# Patient Record
Sex: FEMALE | Race: WHITE | Employment: OTHER | URBAN - METROPOLITAN AREA
[De-identification: names, ages, dates, MRNs, and addresses within clinical notes are randomized per-mention and may not be internally consistent; named-entity substitution may affect disease eponyms.]

---

## 2017-09-26 ENCOUNTER — TELEPHONE (OUTPATIENT)
Dept: CARDIOLOGY CLINIC | Age: 67
End: 2017-09-26

## 2017-09-26 DIAGNOSIS — Z13.220 SCREENING FOR HYPERLIPIDEMIA: ICD-10-CM

## 2017-09-26 DIAGNOSIS — I48.91 ATRIAL FIBRILLATION, UNSPECIFIED TYPE (HCC): Primary | ICD-10-CM

## 2017-09-26 DIAGNOSIS — E55.9 VITAMIN D DEFICIENCY: ICD-10-CM

## 2017-11-03 DIAGNOSIS — I48.91 ATRIAL FIBRILLATION, UNSPECIFIED TYPE (HCC): ICD-10-CM

## 2017-11-03 DIAGNOSIS — Z13.220 SCREENING FOR HYPERLIPIDEMIA: ICD-10-CM

## 2017-11-03 DIAGNOSIS — E55.9 VITAMIN D DEFICIENCY: ICD-10-CM

## 2017-11-06 ENCOUNTER — OFFICE VISIT (OUTPATIENT)
Dept: CARDIOLOGY CLINIC | Age: 67
End: 2017-11-06
Payer: MEDICARE

## 2017-11-06 VITALS
WEIGHT: 180 LBS | SYSTOLIC BLOOD PRESSURE: 130 MMHG | DIASTOLIC BLOOD PRESSURE: 60 MMHG | OXYGEN SATURATION: 100 % | HEART RATE: 78 BPM | BODY MASS INDEX: 25.83 KG/M2

## 2017-11-06 DIAGNOSIS — I48.91 ATRIAL FIBRILLATION, UNSPECIFIED TYPE (HCC): Primary | ICD-10-CM

## 2017-11-06 DIAGNOSIS — E55.9 VITAMIN D DEFICIENCY DISEASE: ICD-10-CM

## 2017-11-06 DIAGNOSIS — I10 ESSENTIAL HYPERTENSION: ICD-10-CM

## 2017-11-06 DIAGNOSIS — E78.5 HYPERLIPIDEMIA, UNSPECIFIED HYPERLIPIDEMIA TYPE: ICD-10-CM

## 2017-11-06 PROCEDURE — 99214 OFFICE O/P EST MOD 30 MIN: CPT | Performed by: INTERNAL MEDICINE

## 2017-11-06 RX ORDER — FLUOXETINE HYDROCHLORIDE 20 MG/1
CAPSULE ORAL
COMMUNITY
Start: 2017-10-18 | End: 2021-11-03

## 2017-11-13 NOTE — PROGRESS NOTES
edema. No syncope or near syncope. She is active with no limitations. She has had no hospitalizations or procedures. CARDIAC RISK FACTORS:  Hyperlipidemia:  Negative. Other Family Members:  Mildly positive. Hypertension:  Negative. Diabetes:  Negative. Smoking:  Negative. Peripheral Vascular Disease:  Negative. MEDICATIONS:  At this time, she is on Cardizem 30 mg p.r.n., Climara 0.0375 mg per hour twice a week, Prozac 20 mg daily, Cytomel 5 mcg 1-1/2 tablets daily, vitamin D 5000 units daily, and krill oil daily. PAST MEDICAL HISTORY:  [facility], tubal ligation in , tonsillectomy in , eye muscle surgery in , foot surgery with a neuroma in , cholesterol was in the 210 range, oatmeal diet and decreased to 170. FAMILY HISTORY:  Brother had an ablation in  for atrial fibrillation. He also possibly had a myocardial infarction. Father  of a stroke after a cancer surgery and mother  of parkinsonism. 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  is a  for [employer] and hopes to retire next year after he has been working for that school system for 20 years. She has 3 children, all sons, with 2 sons in [place] and 1 son in [place]. One son is an , one is a  and the other in [place] works with [employer]. She does not smoke or drink alcohol. REVIEW OF SYSTEMS:  Cardiac as above. Other 10 systems reviewed including neurologic, psychiatric, pulmonary, cardiac,GI, , renal, and musculoskeletal.  She has had no weight loss or weight gain. No change in bowel habits, no blood in stools. No fevers, sweats or chills. PHYSICAL EXAMINATION:  VITAL SIGNS:  Her blood pressure was 130/60 with a heart rate of 78 and regular. Respiratory rate 18. O2 saturation 100%. Weight 180 pounds.   GENERAL:  She is a in followup. DISCUSSION:  Mrs. Shola Villanueva has more episodes of atrial fibrillation. They are occurring every 2 to 3 months but can be several in a row during that time. Her CHADS score is 2 and she is having more episodes than I would anticipate. I think it will be more difficult as time goes on for her to determine when she is in atrial fibrillation. Her options are to start antiarrhythmic along with Xarelto or consider ablation. She has been tried on Calan before and did not tolerate this well. She is currently using Cardizem on an as-needed basis. I have a consult arranged for Dr. Anahi Jurado for evaluation for possible ablation. She has no symptoms to indicate that we need to do an ischemic workup. Again, I think her stress test was most consistent with breast artifact rather than a previous MI especially with her normal LV function with no wall motion abnormalities. I gave her a prescription for Xarelto 20 mg daily and I have called Dr. Anahi Jurado in [place] for an appointment. Thank you very much for allowing me the privilege of seeing Mrs. Morales. Any questions on my thoughts, please do not hesitate to contact me.     Sincerely,        Samir April    D: 11/06/2017 16:18:05       T: 11/07/2017 1:05:43     [id number]  Job#: [id number]     Doc#: [id number]

## 2018-06-21 DIAGNOSIS — I10 ESSENTIAL HYPERTENSION: ICD-10-CM

## 2018-06-21 DIAGNOSIS — E78.5 HYPERLIPIDEMIA, UNSPECIFIED HYPERLIPIDEMIA TYPE: ICD-10-CM

## 2018-06-21 DIAGNOSIS — E55.9 VITAMIN D DEFICIENCY DISEASE: ICD-10-CM

## 2018-06-21 DIAGNOSIS — I48.91 ATRIAL FIBRILLATION, UNSPECIFIED TYPE (HCC): ICD-10-CM

## 2018-06-22 ENCOUNTER — OFFICE VISIT (OUTPATIENT)
Dept: CARDIOLOGY CLINIC | Age: 68
End: 2018-06-22
Payer: MEDICARE

## 2018-06-22 VITALS
BODY MASS INDEX: 25.68 KG/M2 | WEIGHT: 179 LBS | OXYGEN SATURATION: 98 % | HEART RATE: 86 BPM | DIASTOLIC BLOOD PRESSURE: 70 MMHG | SYSTOLIC BLOOD PRESSURE: 122 MMHG

## 2018-06-22 DIAGNOSIS — I48.0 PAROXYSMAL ATRIAL FIBRILLATION (HCC): Primary | ICD-10-CM

## 2018-06-22 PROCEDURE — 99214 OFFICE O/P EST MOD 30 MIN: CPT | Performed by: INTERNAL MEDICINE

## 2019-12-20 ENCOUNTER — TELEPHONE (OUTPATIENT)
Dept: CARDIOLOGY CLINIC | Age: 69
End: 2019-12-20

## 2021-09-29 DIAGNOSIS — E78.5 HYPERLIPIDEMIA, UNSPECIFIED HYPERLIPIDEMIA TYPE: ICD-10-CM

## 2021-09-29 DIAGNOSIS — I48.20 ATRIAL FIBRILLATION, CHRONIC (HCC): Primary | ICD-10-CM

## 2021-09-29 DIAGNOSIS — E55.9 VITAMIN D DEFICIENCY DISEASE: ICD-10-CM

## 2021-09-29 DIAGNOSIS — I10 HYPERTENSION, UNSPECIFIED TYPE: ICD-10-CM

## 2021-10-07 LAB
ALBUMIN SERPL-MCNC: 4 G/DL
ALP BLD-CCNC: 80 U/L
ALT SERPL-CCNC: 20 U/L
ANION GAP SERPL CALCULATED.3IONS-SCNC: 11.3 MMOL/L
AST SERPL-CCNC: 21 U/L
BASOPHILS ABSOLUTE: NORMAL
BASOPHILS RELATIVE PERCENT: NORMAL
BILIRUB SERPL-MCNC: 0.5 MG/DL (ref 0.1–1.4)
BUN BLDV-MCNC: 26 MG/DL
CALCIUM SERPL-MCNC: 8.8 MG/DL
CHLORIDE BLD-SCNC: 105 MMOL/L
CO2: 27.7 MMOL/L
CREAT SERPL-MCNC: 0.81 MG/DL
EOSINOPHILS ABSOLUTE: NORMAL
EOSINOPHILS RELATIVE PERCENT: NORMAL
GFR CALCULATED: NORMAL
GLUCOSE BLD-MCNC: 94 MG/DL
HCT VFR BLD CALC: NORMAL %
HEMOGLOBIN: NORMAL
LYMPHOCYTES ABSOLUTE: NORMAL
LYMPHOCYTES RELATIVE PERCENT: NORMAL
MCH RBC QN AUTO: NORMAL PG
MCHC RBC AUTO-ENTMCNC: NORMAL G/DL
MCV RBC AUTO: NORMAL FL
MONOCYTES ABSOLUTE: NORMAL
MONOCYTES RELATIVE PERCENT: NORMAL
NEUTROPHILS ABSOLUTE: NORMAL
NEUTROPHILS RELATIVE PERCENT: NORMAL
PDW BLD-RTO: NORMAL %
PLATELET # BLD: NORMAL 10*3/UL
PMV BLD AUTO: NORMAL FL
POTASSIUM SERPL-SCNC: 4 MMOL/L
RBC # BLD: NORMAL 10*6/UL
SODIUM BLD-SCNC: 140 MMOL/L
TOTAL PROTEIN: 7.1
TSH SERPL DL<=0.05 MIU/L-ACNC: 1.98 UIU/ML
VITAMIN D 25-HYDROXY: 45
VITAMIN D2, 25 HYDROXY: NORMAL
VITAMIN D3,25 HYDROXY: NORMAL
WBC # BLD: NORMAL 10*3/UL

## 2021-10-25 DIAGNOSIS — E78.5 HYPERLIPIDEMIA, UNSPECIFIED HYPERLIPIDEMIA TYPE: ICD-10-CM

## 2021-10-25 DIAGNOSIS — E55.9 VITAMIN D DEFICIENCY DISEASE: ICD-10-CM

## 2021-10-25 DIAGNOSIS — I48.20 ATRIAL FIBRILLATION, CHRONIC (HCC): ICD-10-CM

## 2021-10-25 DIAGNOSIS — I10 HYPERTENSION, UNSPECIFIED TYPE: ICD-10-CM

## 2021-11-03 ENCOUNTER — HOSPITAL ENCOUNTER (OUTPATIENT)
Dept: NON INVASIVE DIAGNOSTICS | Age: 71
Discharge: HOME | End: 2021-11-03
Payer: MEDICARE

## 2021-11-03 ENCOUNTER — OFFICE VISIT (OUTPATIENT)
Dept: CARDIOLOGY CLINIC | Age: 71
End: 2021-11-03
Payer: MEDICARE

## 2021-11-03 VITALS
HEART RATE: 88 BPM | BODY MASS INDEX: 25.54 KG/M2 | DIASTOLIC BLOOD PRESSURE: 70 MMHG | WEIGHT: 178 LBS | OXYGEN SATURATION: 96 % | SYSTOLIC BLOOD PRESSURE: 120 MMHG

## 2021-11-03 DIAGNOSIS — I48.20 ATRIAL FIBRILLATION, CHRONIC (HCC): Primary | ICD-10-CM

## 2021-11-03 DIAGNOSIS — I48.20 ATRIAL FIBRILLATION, CHRONIC (HCC): ICD-10-CM

## 2021-11-03 PROCEDURE — 99214 OFFICE O/P EST MOD 30 MIN: CPT | Performed by: INTERNAL MEDICINE

## 2021-11-03 PROCEDURE — 93270 REMOTE 30 DAY ECG REV/REPORT: CPT

## 2021-11-03 NOTE — PROGRESS NOTES
Ov Dr. Cayetano García for follow up   Pt states taking Lexapro not prozac   Woke up 6 weeks ago felt terrible   \"wasn't reg a fib\" didn't feel right   Went to pcp-holter ordered   Few episodes of heart speeding up   One episode only   Energy level good  No syncope   No chest pain   Using elliptical weekly   Had eye surgery for \"droopy eye\"   Three sons still doing well  Spent 3 months in [place] this spring   Has an RV they just park in the  [address] [place] [address].   2 sons live in [place]  One in [place]  7 grand kids   Oldest is 15 today girl    bedside echo done       Will set up for event monitor     Recommend Kardia Device     Follow up in 6 weeks no testing

## 2021-11-08 NOTE — PROGRESS NOTES
Anant Cook M.D. [address] [place] [address] Y[address] Seg[place]ia   [phone]          November 3, 2021          Payton Baig MD  [address] [facility], [address] [place] [address]      RE:   Sophia Nye  :  1950      Dear Dr. Coy Hayes:    CHIEF COMPLAINT:  1. Paroxysmal atrial fibrillation. 2.  Status post ablation on 2018, by Dr. Bailee Yanez. 3.  Episode of tachycardia 6 weeks ago with a three-day event recorder placed with short episodes of SVT, which were asymptomatic, at a rate of 130 to 140 beats per minute. HISTORY OF PRESENT ILLNESS:  I had the pleasure of seeing Mrs. Sergio Mckinney in our office on 2021. She is a pleasant 80-year-old female who I met in 2018. She had been having episodes of paroxysmal atrial fibrillation for four to five years, occurring every three to four months. I saw her on 2016, and she was placed on Xarelto and Calan  mg daily. She had easy bruising. Echocardiogram on 2016, was normal.  Stress test was normal and I saw her on 2017. She was having more frequent episodes lasting for 18 hours. She would take Cardizem as needed. On 2017, we discussed ablation. Her CHADS score was 2, with her age being 78 and being female, and therefore, we started Xarelto 20 mg daily, which she remained. She had a subsequent ablation on 2018, by Dr. Bailee Yanez and had an excellent result. She was then taken off Xarelto and Cardizem. I last saw her on 2018, and at that time, she was doing well. I have not seen her since that time. She has had no chest pain or chest discomfort. She has had no palpitations. She has felt excellent. She would occasionally have mild episodes, where she could feel her heart \"beating harder\" but was totally asymptomatic. Six weeks ago, she woke up in the morning feeling \"terrible. \"  Heart was beating fast and \"not regular\" but did not feel like her \"atrial fibrillation. \"  This continued for approximately 45 to 50 minutes and subsided. She saw Dr. Tunde Young and a Holter monitor was ordered. During the three-day Holter monitor, she was totally asymptomatic. She had some short episodes of what appeared to be SVT at approximately 140 beats per minute. She had no prolonged episodes of SVT. She has had no chest pain, shortness of breath or loss of energy. She uses an elliptical weekly for approximately 35 minutes. She has had no hospitalizations or procedures except for eye surgery for \"droopy eye. \"    She has never had a myocardial infarction, never had a cardiac catheterization. I did a treadmill stress test in 2016, which was normal, and an echocardiogram on 2016, which was normal.    CARDIAC RISK FACTORS:  Hyperlipidemia:  Positive. Other Family Members:  Positive. Hypertension:  Negative. Diabetes:  Negative. Smoking:  Negative. Peripheral Vascular Disease:  Negative. MEDICATIONS AT THIS TIME:  She is on Climara 0.0375 mg per 24-hour patch, Cytomel 7.5 mcg b.i.d., vitamin D 1000 units daily. PAST MEDICAL AND SURGICAL HISTORY:  1.  Elbow surgery in  at [facility]. 2.  Hysterectomy in .  3.  Tubal ligation in .  4.  Tonsillectomy in .  5.  Eye muscle surgery in .  6.  Foot surgery with a neuroma in . 7. Hypercholesteremia. FAMILY HISTORY:  Brother had an ablation in  for atrial fibrillation. Father  of a stroke after cancer surgery. Mother  of parkinsonism. SOCIAL HISTORY:  She is 80years old, , retired. 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 She has three sons, with two in [place] and one in [place]. She and her  spent three months in [place] in the spring of 2021 using an RV that they parked at in the driveway.   Her son from [place], who works with [employer], was also in [place] for three months at that time. She has seven grandkids, with the oldest being 15 (\"a daughter, 15, going on 18\"). She does not smoke or drink alcohol. She exercises with a elliptical one day a week. REVIEW OF SYSTEMS:  Cardiac as above. Other systems reviewed including constitutional, eyes, ears, nose and throat, cardiovascular, respiratory, GI, , musculoskeletal, integumentary, neurologic, endocrine, hematologic and allergic/immunologic are negative except for what is described above. No weight loss or weight gain. No change in bowel habits. No blood in stools. No fevers, sweats or chills. PHYSICAL EXAMINATION:  VITAL SIGNS:  Her blood pressure was 120/70 with a heart rate of 88 and regular. Respiratory rate 18. O2 sat 96%. Weight 178 pounds. GENERAL:  She is a pleasant 79-year-old female. Denied pain. She was oriented to person, place and time. Answered questions appropriately. SKIN:  No unusual skin changes. HEENT:  The pupils are equally round and intact. Mucous membranes were dry. NECK:  No JVD. Good carotid pulses. No carotid bruits. No lymphadenopathy or thyromegaly. CARDIOVASCULAR EXAM:  S1 and S2 were normal.  No S3 or S4. Soft systolic blowing type murmur. No diastolic murmur. PMI was normal.  No lift, thrust, or pericardial friction rub. LUNGS:  Clear to auscultation and percussion. ABDOMEN:  Soft and nontender. Good bowel sounds. EXTREMITIES:  Good femoral pulses. Good pedal pulses. No pedal edema. Skin was warm and dry. No calf tenderness. Nail beds pink. Good cap refill. PULSES:  Bilateral symmetrical radial, brachial and carotid pulses. No carotid bruits. Good femoral and pedal pulses. NEUROLOGIC EXAM:  Within normal limits. PSYCHIATRIC EXAM:  Within normal limits. LABORATORY DATA:  Her white count was 5.0, hemoglobin 13.4 with a platelet count 441,466. Sodium was 140, potassium 4.0, glucose 94, BUN 26, creatinine 0.81, calcium was 8.8. ALT was 20, AST was 21. TSH 1.975. Vitamin D was 45. EKG showed normal sinus rhythm with sinus arrhythmia. Holter monitor showed short episodes of SVT of 4 to 6 beats at approximately 130 to 140 beats per minute. They were asymptomatic. There was no bradycardia. IMPRESSION:  1. Episode, 6 weeks ago, where her heart was \"beating fast and feeling terrible,\" lasting for approximately 45 minutes. 2.  Holter monitor showing short episodes of probably, what appears to be, SVT at 130 to 140 beats per minute although these were totally asymptomatic. 3.  Status post paroxysmal atrial fibrillation with ablation by Dr. Dicky Jeans in [facility] on 02/06/2018, with her off from Cardizem and Xarelto at this time. 4.  Normal LV function. 5.  History of mild hyperlipidemia. PLAN:  1. We will give her a 30-day event recorder to try to define her more significant tachycardia that she had 6 weeks ago. 2.  We will have her get a Kardia device to be able to monitor her rhythm also at home. 3.  We will meet with her in approximately 6 weeks to hopefully have a better idea of the arrhythmia that she had 6 weeks ago. DISCUSSION:  Mrs. Wendy Sharp had an episode of severe tachycardia that was quite symptomatic 6 weeks ago. A 3-day event recorder shows, what appears to be, an SVT at 130 to 140 beats per minute. However, she was asymptomatic with this and I am not sure that this is the arrhythmia she had 6 weeks ago. I am going to hold off on any treatment at this time and try to get a better idea of her arrhythmias. We will give her a 30-day event recorder. We will also have her get a Kardia device that I have used with cardiac patients to monitor their rhythm at home. If she would feel the same feeling that she had 6 weeks ago, she could take her own rhythm strips along with now her 30-day event recorder. I will meet with her again in 6 weeks and hopefully she will have some arrhythmias to treat at that time.     I will not anticoagulate as it does not appear to be atrial fibrillation at this time. It could be atrial flutter with 2:1 conduction, but I do not have enough evidence to anticoagulate. Thank you very much for allowing me the privilege of seeing Mrs. Morales. If you have any questions on my thoughts, please do not hesitate to contact me.      Sincerely,        Trudy Benjamin    D: 11/03/2021 12:41:46     T: 11/03/2021 12:46:16     [email or web address]  Job#: [id number]   Doc#: [id number]

## 2021-12-07 NOTE — PROCEDURES
[place] [address]                                 EVENT MONITOR    PATIENT NAME: Danielle Chu                       :        1950  MED REC NO:   [id number]                              ROOM:  ACCOUNT NO:   [de-identified]                           ADMIT DATE: 2021  PROVIDER:     Brittany Oviedo    TEST TYPE:  A 30-DAY EVENT RECORDER. INDICATION FOR STUDY:  PALPITATIONS AND ATRIAL FIBRILLATION. The patient wore an event recorder from 2021 to 2021. Her  lowest heart rate was 50 with a highest heart rate of 188 and average  heart rate of 78 beats per minute. We received a total of 45 rhythm strips with 4 being patient triggered  and 41 being autotriggered. Her underlying rhythm was a normal sinus rhythm. She had multiple episodes of atrial fibrillation with RVR with heart  rates in the 150 to 170 range. She had one less than 1% atrial  fibrillation during the entire recording. She had no bradycardia. This is overall an abnormal 30-day event recorder with multiple episodes  of atrial fibrillation with RVR. She needs to be anticoagulated with Eliquis 5 mg b.i.d. Since she is  having multiple episodes of atrial fibrillation with RVR, I would place  her on Cardizem  mg daily and monitor. She will be monitoring  with her Kardia device at home also.         Emily Coleman    D: 2021 13:06:32       T: 2021 20:13:45     [email or web address]  Job#: [id number]     Doc#: [id number]    CC:  Mayda Wright

## 2021-12-15 ENCOUNTER — OFFICE VISIT (OUTPATIENT)
Dept: CARDIOLOGY CLINIC | Age: 71
End: 2021-12-15
Payer: MEDICARE

## 2021-12-15 VITALS
SYSTOLIC BLOOD PRESSURE: 110 MMHG | WEIGHT: 178 LBS | DIASTOLIC BLOOD PRESSURE: 60 MMHG | BODY MASS INDEX: 25.54 KG/M2 | OXYGEN SATURATION: 97 % | HEART RATE: 74 BPM

## 2021-12-15 DIAGNOSIS — I48.20 ATRIAL FIBRILLATION, CHRONIC (HCC): Primary | ICD-10-CM

## 2021-12-15 DIAGNOSIS — E55.9 VITAMIN D DEFICIENCY DISEASE: ICD-10-CM

## 2021-12-15 DIAGNOSIS — E78.5 HYPERLIPIDEMIA, UNSPECIFIED HYPERLIPIDEMIA TYPE: ICD-10-CM

## 2021-12-15 DIAGNOSIS — I10 HYPERTENSION, UNSPECIFIED TYPE: ICD-10-CM

## 2021-12-15 PROCEDURE — 99214 OFFICE O/P EST MOD 30 MIN: CPT | Performed by: INTERNAL MEDICINE

## 2021-12-21 NOTE — PROGRESS NOTES
Ernestine Mari MD  [facility]  [address]  [phone]      December 15, 2021      Elena Landin MD  [address], [address] [place] [address]      RE:   Sharri Long  :  1950      Dear Dr. Moore Necessary:    CHIEF COMPLAINT:  Recurrent episodes of atrial fibrillation with heart rates between 150 and 170. HISTORY OF PRESENT ILLNESS:  I had the pleasure of meeting with Mrs. Gilda Haynes and her  in the office on 12/15/2021. I trust you received my full H and P from 2021. At that time, she had had episodes of tachycardia and I gave her a 30-day event recorder. She was brought back today to review the event recorder. She states that she has had two events that her heart was \"beating fast.\"  However, when I reviewed the event recorder, she had had multiple short episodes of atrial fibrillation with some mild RVR and some more rapid ventricular responses. I showed her and her  the event recorder and they could see her episodes of atrial fibrillation. She denies any chest pain or chest discomfort or any unusual shortness of breath. No dizziness or lightheadedness. I recommended that we start Eliquis 5 mg b.i.d.  I also recommended starting low dose diltiazem at 30 mg b.i.d. She did get a Kardia device and if she feels any palpitations, she will send a recording. I suspect that we will need to increase her diltiazem, but we will attempt to start it very low and increase as needed. She will call us next month with an update and I will plan on seeing her in two months with an EKG. I did go over with her and her  that even if she has another ablation for her atrial fibrillation that she would still stay on Eliquis indefinitely. Therefore, the reason for ablation would be for symptom control rather than to get off her Eliquis.   At this point, knowing that she does not wish to have another ablation, we will try symptom control. Thank you very much for allowing me the privilege of seeing Mrs. Morales. If you have any questions on my thoughts, please do not hesitate to contact me.     Sincerely,        Leslie Miranda    D: 12/20/2021 2:27:37     T: 12/20/2021 5:46:46     [email or web address]  Job#: [id number]   Doc#: [id number]

## 2022-02-07 ENCOUNTER — OFFICE VISIT (OUTPATIENT)
Dept: CARDIOLOGY CLINIC | Age: 72
End: 2022-02-07
Payer: MEDICARE

## 2022-02-07 ENCOUNTER — HOSPITAL ENCOUNTER (OUTPATIENT)
Age: 72
Discharge: HOME | End: 2022-02-07
Payer: MEDICARE

## 2022-02-07 VITALS
SYSTOLIC BLOOD PRESSURE: 120 MMHG | OXYGEN SATURATION: 96 % | HEART RATE: 77 BPM | BODY MASS INDEX: 26.11 KG/M2 | DIASTOLIC BLOOD PRESSURE: 80 MMHG | WEIGHT: 182 LBS

## 2022-02-07 DIAGNOSIS — E78.5 HYPERLIPIDEMIA, UNSPECIFIED HYPERLIPIDEMIA TYPE: ICD-10-CM

## 2022-02-07 DIAGNOSIS — E55.9 VITAMIN D DEFICIENCY DISEASE: ICD-10-CM

## 2022-02-07 DIAGNOSIS — I10 HYPERTENSION, UNSPECIFIED TYPE: ICD-10-CM

## 2022-02-07 DIAGNOSIS — I48.20 ATRIAL FIBRILLATION, CHRONIC (HCC): Primary | ICD-10-CM

## 2022-02-07 DIAGNOSIS — I48.20 ATRIAL FIBRILLATION, CHRONIC (HCC): ICD-10-CM

## 2022-02-07 PROCEDURE — 93005 ELECTROCARDIOGRAM TRACING: CPT

## 2022-02-07 PROCEDURE — 99214 OFFICE O/P EST MOD 30 MIN: CPT | Performed by: INTERNAL MEDICINE

## 2022-02-07 NOTE — PROGRESS NOTES
Ov Dr. Dustin Richardson for follow up   occ can feel the a fib   When laying down  No chest pain or heaviness   No issues doing things    No changes    Follow up in one year

## 2022-02-08 LAB
EKG ATRIAL RATE: 67 BPM
EKG P AXIS: 69 DEGREES
EKG P-R INTERVAL: 164 MS
EKG Q-T INTERVAL: 382 MS
EKG QRS DURATION: 78 MS
EKG QTC CALCULATION (BAZETT): 403 MS
EKG R AXIS: 73 DEGREES
EKG T AXIS: 67 DEGREES
EKG VENTRICULAR RATE: 67 BPM

## 2022-02-08 PROCEDURE — 93010 ELECTROCARDIOGRAM REPORT: CPT | Performed by: INTERNAL MEDICINE

## 2022-02-09 NOTE — PROGRESS NOTES
Darek Ramirez M.D. [address] [place] [address], [place] [address]  [phone]          2022          Emanuel Andrew MD  [address], [address] [place] [address]      RE:   Leopold Rich  :  1950      Dear Dr. Espitia:    CHIEF COMPLAINT:  1. Atrial fibrillation, relatively well controlled. 2.  On Eliquis. HISTORY OF PRESENT ILLNESS:  I had the pleasure of seeing Leopold Rich again in our office on 2022. I trust you have my full H and P from . We had given her a 30-day event recorder that showed multiple episodes of atrial fibrillation with mild RVR and others went to more rapid ventricular response. This was consistent with atrial fibrillation and I placed her on Eliquis 5 mg b.i.d. We also started diltiazem 30 mg b.i.d., and we brought her back today for reevaluation. She has done excellent. She really cannot feel any atrial fibrillation except occasionally when she lies down at night, she can feel her heart beating slightly fast.  By the time she gets her Kardia device activated, her heart rate is normal.    She has no chest pain or chest discomfort. No shortness of breath. No PND, orthopnea or pedal edema. She really has no complaints as I see her today. She is unlimited in her activity. MEDICATIONS:  Her medications are as follows, Eliquis 5 mg b.i.d., Cardizem 30 mg b.i.d., Climara 0.0375 mg twice a week, Cytomel 5 mcg one and a half tablets b.i.d., vitamin D 1000 units daily. PHYSICAL EXAMINATION:  VITAL SIGNS:  Her blood pressure was excellent at 120/70, heart rate of 70 and regular. Respiratory rate 18. Her exam was unremarkable. IMPRESSION:  1. Paroxysmal atrial fibrillation, well controlled. 2.  On Eliquis 5 mg b.i.d. PLAN:  1. Keep on same medications. 2.  See in one year unless she starts developing new issues. DISCUSSION:  Kandi Felipe, is doing well.   Her atrial fibrillation is well controlled and she is asymptomatic. Therefore, there is no indication to place her on a stronger antiarrhythmic such as flecainide or to do ablation as she would still need to be anticoagulated. If she had difficulty with anticoagulation, she can always get a Watchman device, but at this point, she is doing well with Eliquis. I will plan on seeing her in one year unless she would start developing new symptoms. She and her  are a delight to see and I appreciate being involved in their care very much. Thank you very much.     Sincerely,        Gabby Roberts    D: 02/07/2022 12:36:10     T: 02/07/2022 12:38:55     [email or web address]  Job#: [id number]   Doc#: [id number]

## 2023-01-06 RX ORDER — APIXABAN 5 MG/1
TABLET, FILM COATED ORAL
Qty: 180 TABLET | Refills: 3 | Status: SHIPPED | OUTPATIENT
Start: 2023-01-06

## 2023-02-02 ENCOUNTER — HOSPITAL ENCOUNTER (OUTPATIENT)
Age: 73
Discharge: HOME | End: 2023-02-04
Payer: MEDICARE

## 2023-02-02 ENCOUNTER — HOSPITAL ENCOUNTER (OUTPATIENT)
Dept: GENERAL RADIOLOGY | Age: 73
Discharge: HOME | End: 2023-02-04
Payer: MEDICARE

## 2023-02-02 ENCOUNTER — HOSPITAL ENCOUNTER (OUTPATIENT)
Age: 73
Discharge: HOME | End: 2023-02-02
Payer: MEDICARE

## 2023-02-02 DIAGNOSIS — E78.5 HYPERLIPIDEMIA, UNSPECIFIED HYPERLIPIDEMIA TYPE: ICD-10-CM

## 2023-02-02 DIAGNOSIS — I10 HYPERTENSION, UNSPECIFIED TYPE: ICD-10-CM

## 2023-02-02 DIAGNOSIS — E55.9 VITAMIN D DEFICIENCY DISEASE: ICD-10-CM

## 2023-02-02 DIAGNOSIS — I48.20 ATRIAL FIBRILLATION, CHRONIC (HCC): ICD-10-CM

## 2023-02-02 LAB
25(OH)D3 SERPL-MCNC: 45.8 NG/ML
ABSOLUTE EOS #: 0.1 K/UL (ref 0–0.4)
ABSOLUTE LYMPH #: 1.7 K/UL (ref 1–4.8)
ABSOLUTE MONO #: 0.4 K/UL (ref 0–1)
ALBUMIN SERPL-MCNC: 4.4 G/DL (ref 3.5–5.2)
ALP SERPL-CCNC: 74 U/L (ref 35–104)
ALT SERPL-CCNC: 24 U/L (ref 5–33)
ANION GAP SERPL CALCULATED.3IONS-SCNC: 11 MMOL/L (ref 9–17)
AST SERPL-CCNC: 24 U/L
BASOPHILS # BLD: 1 % (ref 0–2)
BASOPHILS ABSOLUTE: 0 K/UL (ref 0–0.2)
BILIRUB SERPL-MCNC: 0.8 MG/DL (ref 0.3–1.2)
BUN SERPL-MCNC: 23 MG/DL (ref 8–23)
BUN/CREAT BLD: 27 (ref 9–20)
CALCIUM SERPL-MCNC: 9.4 MG/DL (ref 8.6–10.4)
CHLORIDE SERPL-SCNC: 101 MMOL/L (ref 98–107)
CHOLEST SERPL-MCNC: 207 MG/DL
CHOLESTEROL/HDL RATIO: 3.6
CO2 SERPL-SCNC: 26 MMOL/L (ref 20–31)
CREAT SERPL-MCNC: 0.84 MG/DL (ref 0.5–0.9)
DIFFERENTIAL TYPE: YES
EOSINOPHILS RELATIVE PERCENT: 3 % (ref 0–5)
GFR SERPL CREATININE-BSD FRML MDRD: >60 ML/MIN/1.73M2
GLUCOSE SERPL-MCNC: 94 MG/DL (ref 70–99)
HCT VFR BLD AUTO: 39.9 % (ref 36–46)
HDLC SERPL-MCNC: 57 MG/DL
HGB BLD-MCNC: 13.4 G/DL (ref 12–16)
LDLC SERPL CALC-MCNC: 136 MG/DL (ref 0–130)
LYMPHOCYTES # BLD: 40 % (ref 15–40)
MAGNESIUM SERPL-MCNC: 2.2 MG/DL (ref 1.6–2.6)
MCH RBC QN AUTO: 28.2 PG (ref 26–34)
MCHC RBC AUTO-ENTMCNC: 33.6 G/DL (ref 31–37)
MCV RBC AUTO: 83.8 FL (ref 80–100)
MONOCYTES # BLD: 8 % (ref 4–8)
PATIENT FASTING?: YES
PDW BLD-RTO: 12.5 % (ref 12.1–15.2)
PLATELET # BLD AUTO: 260 K/UL (ref 140–450)
POTASSIUM SERPL-SCNC: 4 MMOL/L (ref 3.7–5.3)
PROT SERPL-MCNC: 7 G/DL (ref 6.4–8.3)
RBC # BLD: 4.76 M/UL (ref 4–5.2)
SEG NEUTROPHILS: 48 % (ref 47–75)
SEGMENTED NEUTROPHILS ABSOLUTE COUNT: 2.1 K/UL (ref 2.5–7)
SODIUM SERPL-SCNC: 138 MMOL/L (ref 135–144)
TRIGL SERPL-MCNC: 68 MG/DL
TSH SERPL-ACNC: 1.86 UIU/ML (ref 0.3–5)
WBC # BLD AUTO: 4.4 K/UL (ref 3.5–11)

## 2023-02-02 PROCEDURE — 84443 ASSAY THYROID STIM HORMONE: CPT

## 2023-02-02 PROCEDURE — 85025 COMPLETE CBC W/AUTO DIFF WBC: CPT

## 2023-02-02 PROCEDURE — 36415 COLL VENOUS BLD VENIPUNCTURE: CPT

## 2023-02-02 PROCEDURE — 93005 ELECTROCARDIOGRAM TRACING: CPT

## 2023-02-02 PROCEDURE — 82306 VITAMIN D 25 HYDROXY: CPT

## 2023-02-02 PROCEDURE — 83735 ASSAY OF MAGNESIUM: CPT

## 2023-02-02 PROCEDURE — 80061 LIPID PANEL: CPT

## 2023-02-02 PROCEDURE — 80053 COMPREHEN METABOLIC PANEL: CPT

## 2023-02-02 PROCEDURE — 71046 X-RAY EXAM CHEST 2 VIEWS: CPT

## 2023-02-03 LAB
EKG ATRIAL RATE: 72 BPM
EKG P AXIS: 76 DEGREES
EKG P-R INTERVAL: 158 MS
EKG Q-T INTERVAL: 370 MS
EKG QRS DURATION: 72 MS
EKG QTC CALCULATION (BAZETT): 405 MS
EKG R AXIS: 74 DEGREES
EKG T AXIS: 70 DEGREES
EKG VENTRICULAR RATE: 72 BPM

## 2023-02-06 ENCOUNTER — OFFICE VISIT (OUTPATIENT)
Dept: CARDIOLOGY CLINIC | Age: 73
End: 2023-02-06
Payer: MEDICARE

## 2023-02-06 VITALS
BODY MASS INDEX: 26.26 KG/M2 | SYSTOLIC BLOOD PRESSURE: 110 MMHG | DIASTOLIC BLOOD PRESSURE: 60 MMHG | HEART RATE: 86 BPM | OXYGEN SATURATION: 98 % | WEIGHT: 183 LBS

## 2023-02-06 DIAGNOSIS — I48.20 ATRIAL FIBRILLATION, CHRONIC (HCC): Primary | ICD-10-CM

## 2023-02-06 DIAGNOSIS — E78.5 HYPERLIPIDEMIA, UNSPECIFIED HYPERLIPIDEMIA TYPE: ICD-10-CM

## 2023-02-06 DIAGNOSIS — I10 HYPERTENSION, UNSPECIFIED TYPE: ICD-10-CM

## 2023-02-06 PROCEDURE — 1123F ACP DISCUSS/DSCN MKR DOCD: CPT | Performed by: INTERNAL MEDICINE

## 2023-02-06 PROCEDURE — 99214 OFFICE O/P EST MOD 30 MIN: CPT | Performed by: INTERNAL MEDICINE

## 2023-02-06 PROCEDURE — 3074F SYST BP LT 130 MM HG: CPT | Performed by: INTERNAL MEDICINE

## 2023-02-06 PROCEDURE — 3078F DIAST BP <80 MM HG: CPT | Performed by: INTERNAL MEDICINE

## 2023-02-06 NOTE — PROGRESS NOTES
Ov DR Margaree Litten 1 year follow up   No chest pain or sob  No palpitations. Had rt rotator cuff surgery  In the fall at [facility]. Did have colonoscopy done  Last week. Has 15 yr old grand daughter  Sabine Harris  In [place] is a trip\"  ADHD  Spent October in [place]  For a month after surgery. Will see in 1 year.

## 2023-02-08 NOTE — PROGRESS NOTES
Meredith Syed M.D. [address] [facility]   [phone]          2023          Demi Juarez MD  [address], [address] [place] [address]      RE:   Adali Jin  :  1950      Dear Dr. Godwin Briggs:    CHIEF COMPLAINT:  1. Paroxysmal atrial fibrillation. 2.  On Eliquis and Cardizem. HISTORY OF PRESENT ILLNESS:  I had the pleasure of seeing Mrs. Adali Jin in our office on 2023. (By the way, I was sorry to hear that you need surgical treatment of your neck. .. I hope everything goes well!)    Porfirio Jacob is a very pleasant 64-year-old female who has paroxysmal atrial fibrillation for 45 years occurring every three to four months. I saw her on 2016, when she was placed on Xarelto and Calan  mg daily. Echocardiogram on 2016, was normal.  Stress test was normal.  She was having more frequent episodes lasting up to 18 hours. On 2017, we discussed ablation. Her CHADS score was 2 and she continued her Xarelto 20 mg daily. She did have ablation on 2018, by Dr. Ranulfo Urbina with an excellent result. She was taken off Xarelto and Cardizem and I had seen her on 2018. I then saw her on 2021. She had woken up 6 weeks prior feeling \"terrible. \"  Her heart was beating fast.  This continued for approximately 45 to 50 minutes and subsided and Dr. Godwin Briggs ordered a Holter monitor. She was asymptomatic during the three-day Holter monitor. I then did a 30-day event recorder. The event recorder was abnormal showing multiple episodes of atrial fibrillation with mild RVR, also into more of a rapid ventricular response consistent with atrial fibrillation. We placed her back on Eliquis 5 mg b.i.d. and we also started Cardizem 30 mg b.i.d.  I last saw her on 2022. At that time, she was doing well with no symptoms.   She was also having no problems with any bleeding issues. She has done well over the past year. She did have a rotator cuff repair at [facility] on 2022 of her right shoulder by Dr. Sidney Callahan. She went to [place] to stay with her sons for one month in 2022. She is now back at home and is in physical therapy with making a good recovery from her right rotator cuff surgery. She did have colonoscopy last week, which was unremarkable. She is doing well. She has had no palpitations. No known episodes of atrial fibrillation. Denies any chest pain or chest discomfort or any unusual shortness of breath. She has had no PND or orthopnea. No syncope or near syncope. CARDIAC RISK FACTORS:  Hyperlipidemia:  Positive. Other Family Members:  Positive. Hypertension:  Negative. Diabetes:  Negative. Smoking:  Negative. Peripheral Vascular Disease:  Negative. MEDICATIONS AT HOME:  She is currently on Eliquis 5 mg b.i.d., Cardizem 30 mg b.i.d., Climara 0.0375 mg one patch b.i.d., Cytomel 7.5 mcg b.i.d., krill oil 500 mg b.i.d., vitamin D 1000 units daily. PAST MEDICAL AND SURGICAL HISTORY:  1.  Elbow surgery in  at [facility]. 2.  Hysterectomy in .  3.  Tubal ligation in .  4.  Tonsillectomy in .  5.  Eye muscle surgery in .  6.  Foot surgery with a neuroma in .  7. She has had hypercholesteremia, not on treatment. 8.  Right rotator cuff surgery at [facility] by Dr. Sidney Callahan on 2022.  9.  Colonoscopy one week ago. FAMILY HISTORY:  Brother had ablation in  for atrial fibrillation. Father  of a stroke after cancer surgery. Mother  of parkinsonism. SOCIAL HISTORY:  She is 71years old. , tired. 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 She has three sons, with two in [place], one in [place].   Her son in [place] has three children with a 15year-old daughter by the name, Krzysztof Santana, is on the spectrum and \"is a trip. \"  She has a brother who is 6, who is also [employer] trip. \"  There is a 5year-old youngest son who is \"normal.\"  Does not smoke or drink alcohol. She is in rehab now for her right shoulder surgery. REVIEW OF SYSTEMS:  Cardiac as above. Other systems reviewed including constitutional, eyes, ears, nose and throat, cardiovascular, respiratory, GI, , musculoskeletal, integumentary, neurologic, endocrine, hematologic and allergic/immunologic are negative except for what is described above. No weight loss or weight gain. No change in bowel habits. No blood in stools. No fevers, sweats or chills. PHYSICAL EXAMINATION:  VITAL SIGNS:  Her blood pressure was 110/60 with a heart rate of 86 and regular. Respirations 18. O2 sat 98%. Weight 183 pounds. GENERAL:  She is a pleasant 52-year-old female. Denied pain. She was oriented to person, place and time. Answered questions appropriately. SKIN:  No unusual skin changes. HEENT:  The pupils are equally round and intact. Mucous membranes were dry. NECK:  No JVD. Good carotid pulses. No carotid bruits. No lymphadenopathy or thyromegaly. CARDIOVASCULAR EXAM:  S1 and S2 were normal.  No S3 or S4. Soft systolic blowing type murmur. No diastolic murmur. PMI was normal.  No lift, thrust, or pericardial friction rub. LUNGS:  Clear to auscultation and percussion. ABDOMEN:  Soft and nontender. Good bowel sounds. The aorta was not enlarged. No hepatomegaly, splenomegaly. EXTREMITIES:  Good femoral pulses. Good pedal pulses. No pedal edema. Skin was warm and dry. No calf tenderness. Nail beds pink. Good cap refill. PULSES:  Bilateral symmetrical radial, brachial and carotid pulses. No carotid bruits. Good femoral and pedal pulses. NEUROLOGIC EXAM:  Within normal limits. PSYCHIATRIC EXAM:  Within normal limits.     LABORATORY DATA:  Sodium was 138, potassium 4.0, BUN 23, creatinine 1.84, GFR greater than 60, magnesium 2.2, glucose 94, calcium was 9.4. Cholesterol 207, with an HDL of 57, , triglycerides 68. ALT was 24, AST was 24. TSH was 1.86. Vitamin D 45.8. White count 4.4, hemoglobin 13.4 with a platelet count of 228,730. EKG showed normal sinus rhythm and was normal.    Chest x-ray was unremarkable. IMPRESSION:  1. Paroxysmal atrial fibrillation, currently on Eliquis and Cardizem 30 mg b.i.d.  2.  Paroxysmal atrial fibrillation with ablation by Dr. Coretta Lopez in [place] on 02/06/2018. 3.  Recurrent episodes of paroxysmal atrial fibrillation on a 30-day event recorder done on 11/03/2021, with her being placed on Eliquis and Cardizem at that time with no further episodes. 4.  Status post right rotator cuff surgery in [facility] by Dr. Ramona Espinal on 09/29/2022.  5.  Normal colonoscopy done one week ago. 6.  Mild hyperlipidemia, not treated. PLAN:  1. No change in medications. 2.  See in one year. DISCUSSION:  Nancie Estrada, is doing well. She has had no chest pain, shortness of breath or loss of energy. There is no indication that she has any underlying angina or heart issues. She may well be having asymptomatic paroxysmal atrial fibrillation, but she has no symptoms and she is protected with Eliquis. Therefore, I made no change in her medications. I will plan on seeing her in one year. Thank you very much for allowing me the privilege of seeing Margarito Martinez. If you have any questions on my thoughts, please do not hesitate to contact me.      Sincerely,        Lesa Payne MD    D: 02/06/2023 12:07:17     T: 02/06/2023 12:13:06     [email or web address]  Job#: [id number]   Doc#: [id number]

## 2024-01-10 DIAGNOSIS — E55.9 VITAMIN D DEFICIENCY DISEASE: ICD-10-CM

## 2024-01-10 DIAGNOSIS — E78.5 HYPERLIPIDEMIA, UNSPECIFIED HYPERLIPIDEMIA TYPE: ICD-10-CM

## 2024-01-10 DIAGNOSIS — I48.20 ATRIAL FIBRILLATION, CHRONIC (HCC): Primary | ICD-10-CM

## 2024-01-10 DIAGNOSIS — I10 HYPERTENSION, UNSPECIFIED TYPE: ICD-10-CM

## 2024-03-21 DIAGNOSIS — E78.5 HYPERLIPIDEMIA, UNSPECIFIED HYPERLIPIDEMIA TYPE: ICD-10-CM

## 2024-03-21 DIAGNOSIS — I10 HYPERTENSION, UNSPECIFIED TYPE: ICD-10-CM

## 2024-03-21 DIAGNOSIS — I48.20 ATRIAL FIBRILLATION, CHRONIC (HCC): Primary | ICD-10-CM

## 2024-03-21 DIAGNOSIS — E55.9 VITAMIN D DEFICIENCY: ICD-10-CM

## 2024-04-11 DIAGNOSIS — E78.5 HYPERLIPIDEMIA, UNSPECIFIED HYPERLIPIDEMIA TYPE: ICD-10-CM

## 2024-04-11 DIAGNOSIS — E55.9 VITAMIN D DEFICIENCY DISEASE: ICD-10-CM

## 2024-04-11 DIAGNOSIS — I10 HYPERTENSION, UNSPECIFIED TYPE: ICD-10-CM

## 2024-04-11 DIAGNOSIS — I48.20 ATRIAL FIBRILLATION, CHRONIC (HCC): Primary | ICD-10-CM

## 2024-08-20 ENCOUNTER — HOSPITAL ENCOUNTER (OUTPATIENT)
Age: 74
Discharge: HOME | End: 2024-08-20
Payer: MEDICARE

## 2024-08-20 ENCOUNTER — OFFICE VISIT (OUTPATIENT)
Dept: CARDIOLOGY CLINIC | Age: 74
End: 2024-08-20

## 2024-08-20 VITALS
RESPIRATION RATE: 16 BRPM | WEIGHT: 181 LBS | DIASTOLIC BLOOD PRESSURE: 62 MMHG | OXYGEN SATURATION: 96 % | SYSTOLIC BLOOD PRESSURE: 112 MMHG | BODY MASS INDEX: 25.97 KG/M2 | HEART RATE: 70 BPM

## 2024-08-20 DIAGNOSIS — E55.9 VITAMIN D DEFICIENCY DISEASE: ICD-10-CM

## 2024-08-20 DIAGNOSIS — I48.20 ATRIAL FIBRILLATION, CHRONIC (HCC): ICD-10-CM

## 2024-08-20 DIAGNOSIS — I10 HYPERTENSION, UNSPECIFIED TYPE: ICD-10-CM

## 2024-08-20 DIAGNOSIS — E78.5 HYPERLIPIDEMIA, UNSPECIFIED HYPERLIPIDEMIA TYPE: ICD-10-CM

## 2024-08-20 DIAGNOSIS — I48.20 ATRIAL FIBRILLATION, CHRONIC (HCC): Primary | ICD-10-CM

## 2024-08-20 LAB
25(OH)D3 SERPL-MCNC: 47 NG/ML (ref 30–100)
ALBUMIN SERPL-MCNC: 4.2 G/DL (ref 3.5–5.2)
ALP SERPL-CCNC: 76 U/L (ref 35–104)
ALT SERPL-CCNC: 16 U/L (ref 5–33)
ANION GAP SERPL CALCULATED.3IONS-SCNC: 9 MMOL/L (ref 9–17)
AST SERPL-CCNC: 22 U/L
BASOPHILS # BLD: 0.02 K/UL (ref 0–0.2)
BASOPHILS NFR BLD: 0 % (ref 0–2)
BILIRUB SERPL-MCNC: 0.7 MG/DL (ref 0.3–1.2)
BUN SERPL-MCNC: 18 MG/DL (ref 8–23)
BUN/CREAT SERPL: 23 (ref 9–20)
CALCIUM SERPL-MCNC: 9.5 MG/DL (ref 8.6–10.4)
CHLORIDE SERPL-SCNC: 101 MMOL/L (ref 98–107)
CHOLEST SERPL-MCNC: 194 MG/DL (ref 0–199)
CHOLESTEROL/HDL RATIO: 4
CO2 SERPL-SCNC: 26 MMOL/L (ref 20–31)
CREAT SERPL-MCNC: 0.8 MG/DL (ref 0.5–0.9)
EOSINOPHIL # BLD: 0.14 K/UL (ref 0–0.4)
EOSINOPHILS RELATIVE PERCENT: 3 % (ref 0–5)
ERYTHROCYTE [DISTWIDTH] IN BLOOD BY AUTOMATED COUNT: 12.6 % (ref 12.1–15.2)
GFR, ESTIMATED: 78 ML/MIN/1.73M2
GLUCOSE SERPL-MCNC: 95 MG/DL (ref 70–99)
HCT VFR BLD AUTO: 39.3 % (ref 36–46)
HDLC SERPL-MCNC: 52 MG/DL
HGB BLD-MCNC: 13.1 G/DL (ref 12–16)
IMM GRANULOCYTES # BLD AUTO: 0.01 K/UL (ref 0–0.3)
IMM GRANULOCYTES NFR BLD: 0 % (ref 0–5)
LDLC SERPL CALC-MCNC: 128 MG/DL (ref 0–100)
LYMPHOCYTES NFR BLD: 1.67 K/UL (ref 1–4.8)
LYMPHOCYTES RELATIVE PERCENT: 36 % (ref 15–40)
MAGNESIUM SERPL-MCNC: 2.2 MG/DL (ref 1.6–2.6)
MCH RBC QN AUTO: 29 PG (ref 26–34)
MCHC RBC AUTO-ENTMCNC: 33.3 G/DL (ref 31–37)
MCV RBC AUTO: 87.1 FL (ref 80–100)
MONOCYTES NFR BLD: 0.43 K/UL (ref 0–1)
MONOCYTES NFR BLD: 9 % (ref 4–8)
NEUTROPHILS NFR BLD: 52 % (ref 47–75)
NEUTS SEG NFR BLD: 2.38 K/UL (ref 2.5–7)
PLATELET # BLD AUTO: 253 K/UL (ref 140–450)
PMV BLD AUTO: 10 FL (ref 6–12)
POTASSIUM SERPL-SCNC: 3.8 MMOL/L (ref 3.7–5.3)
PROT SERPL-MCNC: 6.8 G/DL (ref 6.4–8.3)
RBC # BLD AUTO: 4.51 M/UL (ref 4–5.2)
SODIUM SERPL-SCNC: 136 MMOL/L (ref 135–144)
TRIGL SERPL-MCNC: 73 MG/DL
TSH SERPL DL<=0.05 MIU/L-ACNC: 2.52 UIU/ML (ref 0.3–5)
VLDLC SERPL CALC-MCNC: 15 MG/DL
WBC OTHER # BLD: 4.7 K/UL (ref 3.5–11)

## 2024-08-20 PROCEDURE — 36415 COLL VENOUS BLD VENIPUNCTURE: CPT

## 2024-08-20 PROCEDURE — 83735 ASSAY OF MAGNESIUM: CPT

## 2024-08-20 PROCEDURE — 80053 COMPREHEN METABOLIC PANEL: CPT

## 2024-08-20 PROCEDURE — 85025 COMPLETE CBC W/AUTO DIFF WBC: CPT

## 2024-08-20 PROCEDURE — 80061 LIPID PANEL: CPT

## 2024-08-20 PROCEDURE — 84443 ASSAY THYROID STIM HORMONE: CPT

## 2024-08-20 PROCEDURE — 93005 ELECTROCARDIOGRAM TRACING: CPT

## 2024-08-20 PROCEDURE — 82306 VITAMIN D 25 HYDROXY: CPT

## 2024-08-20 RX ORDER — LORATADINE 10 MG/1
10 CAPSULE, LIQUID FILLED ORAL DAILY
COMMUNITY

## 2024-08-20 RX ORDER — M-VIT,TX,IRON,MINS/CALC/FOLIC 27MG-0.4MG
1 TABLET ORAL DAILY
COMMUNITY

## 2024-08-20 NOTE — PROGRESS NOTES
Sho Justin CNP 1 year   Follow up   Occ palpitations   Occ edema   Road in car for awhile  When going to   [place] to visit  Kids and grand kids.  No chest pain or sob  No dizziness.

## 2024-08-21 LAB
EKG ATRIAL RATE: 68 BPM
EKG P AXIS: 59 DEGREES
EKG P-R INTERVAL: 170 MS
EKG Q-T INTERVAL: 388 MS
EKG QRS DURATION: 76 MS
EKG QTC CALCULATION (BAZETT): 412 MS
EKG R AXIS: 52 DEGREES
EKG T AXIS: 61 DEGREES
EKG VENTRICULAR RATE: 68 BPM

## 2024-08-29 NOTE — PROGRESS NOTES
[facility]  [address] [place] [address]  Phone: [phone], Fax: [phone]       Patient: Meghana Morales  : 1950  Date of Visit: 2024    REASON FOR VISIT / CONSULTATION: Atrial Fibrillation      History of Present Illness:        Dear Brina, Bautista PETERSON MD    We had the pleasure of seeing Meghana Morales and her , Emmanuel, in our office today. Ms. Morales is a pleasant 74 y.o. female who has paroxysmal atrial fibrillation since  occurring every three to four months.     I saw her on 2016, when she was placed on Xarelto and Calan  mg daily.  Echocardiogram on 2016, was normal.  Stress test was normal.  She was having more frequent episodes lasting up to 18 hours.     On 2017, we discussed ablation.  Her CHADS score was 2 and she continued her Xarelto 20 mg daily.     She did have ablation on 2018, by Dr. Perez with an excellent result.  She was taken off Xarelto and Cardizem.     Dr. Lobo saw her on 2021.  She had woken up 6 weeks prior feeling \"terrible.\"  Her heart was beating fast.  This continued for approximately 45 to 50 minutes and subsided and Dr. Gonsalves ordered a Holter monitor.  She was asymptomatic during the three-day Holter monitor.     She then did a 30-day event recorder.  The event recorder was abnormal showing multiple episodes of atrial fibrillation with mild RVR, also into more of a rapid ventricular response consistent with atrial fibrillation.  On 2021, we placed her back on Eliquis 5 mg b.i.d. and we also started Cardizem 30 mg b.i.d.     She does have occasional palpitations that last for a short time.  She has been doing well and using her [employer] mobile to monitor her rhythm with couple episodes of irregular rhythm.     Meghana Morales has no abdominal pain, nausea or vomiting at this time.   She has had no PND or orthopnea.   She had some ankle edema while riding in the car to [place] to  well.   She has had no chest pain, chest discomfort, shortness of breath or change in energy level.   She will continue her medications as ordered.   Her risk factors are nicely modified.   She has no chest pain or chest discomfort to indicate need to do further ischemia workup.    She has occasional palpitations and is monitoring on her [employer] mobile which shows irregular rhythm but is not atrial fibrillation.  Reviewed by Dr. Lobo.  Will continue Cardizem and Eliquis as prescribed.    Asked if she was able to take Nutrafol for her thinning hair, advised that would be no problem for her.     We told  Ms. Morales to call our office if she has any problems, but otherwise we asked her to follow up in 1 year and we will repeat labs, chest x-ray and EKG. However, we would be happy to see her sooner should the need arise.      Thank you very much for allowing us the privilege of seeing Ms. Morales.  If you have any questions on our  thoughts, please do not hesitate to contact us.     Sincerely,  TONIO Hernandez - CNP  [facility]  [address] [place] [id number]  Phone: [phone], Fax: [phone]

## 2025-03-10 RX ORDER — DILTIAZEM HYDROCHLORIDE 30 MG/1
TABLET, FILM COATED ORAL
Qty: 180 TABLET | Refills: 3 | Status: SHIPPED | OUTPATIENT
Start: 2025-03-10 | End: 2025-03-10 | Stop reason: SDUPTHER

## 2025-03-10 RX ORDER — DILTIAZEM HYDROCHLORIDE 30 MG/1
30 TABLET, FILM COATED ORAL 2 TIMES DAILY
Qty: 180 TABLET | Refills: 3 | Status: SHIPPED | OUTPATIENT
Start: 2025-03-10

## 2025-04-17 ENCOUNTER — HOSPITAL ENCOUNTER
Dept: HOSPITAL 101 - CT | Age: 75
Discharge: HOME | End: 2025-04-17
Payer: MEDICARE

## 2025-04-17 ENCOUNTER — HOSPITAL ENCOUNTER (OUTPATIENT)
Dept: HOSPITAL 101 - INF | Age: 75
LOS: 13 days | Discharge: HOME | End: 2025-04-30
Payer: MEDICARE

## 2025-04-17 VITALS — OXYGEN SATURATION: 95 % | SYSTOLIC BLOOD PRESSURE: 97 MMHG | HEART RATE: 72 BPM | DIASTOLIC BLOOD PRESSURE: 61 MMHG

## 2025-04-17 VITALS — DIASTOLIC BLOOD PRESSURE: 50 MMHG | HEART RATE: 72 BPM | OXYGEN SATURATION: 95 % | SYSTOLIC BLOOD PRESSURE: 84 MMHG

## 2025-04-17 VITALS
DIASTOLIC BLOOD PRESSURE: 68 MMHG | SYSTOLIC BLOOD PRESSURE: 119 MMHG | OXYGEN SATURATION: 97 % | HEART RATE: 82 BPM | TEMPERATURE: 98 F

## 2025-04-17 DIAGNOSIS — R91.8: ICD-10-CM

## 2025-04-17 DIAGNOSIS — K76.9: ICD-10-CM

## 2025-04-17 DIAGNOSIS — J90: Primary | ICD-10-CM

## 2025-04-17 LAB — BOX TEST REFERENCE LAB: (no result)

## 2025-04-17 PROCEDURE — 84157 ASSAY OF PROTEIN OTHER: CPT

## 2025-04-17 PROCEDURE — 89051 BODY FLUID CELL COUNT: CPT

## 2025-04-17 PROCEDURE — 87070 CULTURE OTHR SPECIMN AEROBIC: CPT

## 2025-04-17 PROCEDURE — 82150 ASSAY OF AMYLASE: CPT

## 2025-04-17 PROCEDURE — 36415 COLL VENOUS BLD VENIPUNCTURE: CPT

## 2025-04-17 PROCEDURE — 88112 CYTOPATH CELL ENHANCE TECH: CPT

## 2025-04-17 PROCEDURE — 88341 IMHCHEM/IMCYTCHM EA ADD ANTB: CPT

## 2025-04-17 PROCEDURE — 83986 ASSAY PH BODY FLUID NOS: CPT

## 2025-04-17 PROCEDURE — 84478 ASSAY OF TRIGLYCERIDES: CPT

## 2025-04-17 PROCEDURE — 71250 CT THORAX DX C-: CPT

## 2025-04-17 PROCEDURE — 83615 LACTATE (LD) (LDH) ENZYME: CPT

## 2025-04-17 PROCEDURE — 87205 SMEAR GRAM STAIN: CPT

## 2025-04-17 PROCEDURE — 82945 GLUCOSE OTHER FLUID: CPT

## 2025-04-17 PROCEDURE — 89050 BODY FLUID CELL COUNT: CPT

## 2025-04-17 PROCEDURE — 88305 TISSUE EXAM BY PATHOLOGIST: CPT

## 2025-04-17 PROCEDURE — 88342 IMHCHEM/IMCYTCHM 1ST ANTB: CPT

## 2025-04-18 LAB
GLUCOSE, BODY FLUID: 94 MG/DL
LD, BODY FLUID: 348 IU/L
Lab: (no result)
Lab: 0 %
Lab: 19 MG/DL
Lab: 25 %
Lab: 28 % (ref 0–24)
Lab: 4000 /UL
Lab: 47 %
Lab: 527 /MM3 (ref 0–499)

## 2025-05-19 ENCOUNTER — TELEPHONE (OUTPATIENT)
Dept: CARDIOLOGY CLINIC | Age: 75
End: 2025-05-19

## 2025-05-19 NOTE — TELEPHONE ENCOUNTER
Irina from [facility] contacted office. States that pt is having a liver bx done on 05-22-25 and needs instructions for stopping Eliquis. States that Meghana has already taken herself off Eliquis for bx. Please review chart and approve pt stopping medication for duration of time.

## 2025-08-26 ENCOUNTER — HOSPITAL ENCOUNTER (OUTPATIENT)
Dept: LAB | Age: 75
Discharge: HOME | End: 2025-08-26
Payer: MEDICARE

## 2025-08-26 ENCOUNTER — HOSPITAL ENCOUNTER (OUTPATIENT)
Dept: GENERAL RADIOLOGY | Age: 75
Discharge: HOME | End: 2025-08-28
Payer: MEDICARE

## 2025-08-26 ENCOUNTER — HOSPITAL ENCOUNTER (OUTPATIENT)
Age: 75
Discharge: HOME | End: 2025-08-28
Payer: MEDICARE

## 2025-08-26 ENCOUNTER — HOSPITAL ENCOUNTER (OUTPATIENT)
Dept: NON INVASIVE DIAGNOSTICS | Age: 75
Discharge: HOME | End: 2025-08-26
Payer: MEDICARE

## 2025-08-26 ENCOUNTER — OFFICE VISIT (OUTPATIENT)
Dept: CARDIOLOGY CLINIC | Age: 75
End: 2025-08-26
Payer: MEDICARE

## 2025-08-26 VITALS
HEART RATE: 104 BPM | RESPIRATION RATE: 16 BRPM | WEIGHT: 169 LBS | OXYGEN SATURATION: 95 % | SYSTOLIC BLOOD PRESSURE: 115 MMHG | BODY MASS INDEX: 24.25 KG/M2 | DIASTOLIC BLOOD PRESSURE: 72 MMHG

## 2025-08-26 DIAGNOSIS — R06.02 SHORTNESS OF BREATH: ICD-10-CM

## 2025-08-26 DIAGNOSIS — I48.0 PAROXYSMAL ATRIAL FIBRILLATION (HCC): ICD-10-CM

## 2025-08-26 DIAGNOSIS — I10 HYPERTENSION, UNSPECIFIED TYPE: ICD-10-CM

## 2025-08-26 DIAGNOSIS — I48.20 ATRIAL FIBRILLATION, CHRONIC (HCC): ICD-10-CM

## 2025-08-26 DIAGNOSIS — E55.9 VITAMIN D DEFICIENCY DISEASE: ICD-10-CM

## 2025-08-26 DIAGNOSIS — I48.20 ATRIAL FIBRILLATION, CHRONIC (HCC): Primary | ICD-10-CM

## 2025-08-26 DIAGNOSIS — E78.5 HYPERLIPIDEMIA, UNSPECIFIED HYPERLIPIDEMIA TYPE: ICD-10-CM

## 2025-08-26 DIAGNOSIS — I48.0 PAROXYSMAL ATRIAL FIBRILLATION (HCC): Primary | ICD-10-CM

## 2025-08-26 LAB
25(OH)D3 SERPL-MCNC: 72.8 NG/ML (ref 30–100)
ALBUMIN SERPL-MCNC: 4 G/DL (ref 3.5–5.2)
ALBUMIN/GLOB SERPL: 1.4 {RATIO} (ref 1–2.5)
ALP SERPL-CCNC: 98 U/L (ref 35–104)
ALT SERPL-CCNC: 27 U/L (ref 5–33)
ANION GAP SERPL CALCULATED.3IONS-SCNC: 9 MMOL/L (ref 9–17)
AST SERPL-CCNC: 27 U/L
BASOPHILS # BLD: 0.01 K/UL (ref 0–0.2)
BASOPHILS NFR BLD: 0 % (ref 0–2)
BILIRUB SERPL-MCNC: 0.3 MG/DL (ref 0.3–1.2)
BUN SERPL-MCNC: 18 MG/DL (ref 8–23)
CALCIUM SERPL-MCNC: 9.3 MG/DL (ref 8.6–10.4)
CHLORIDE SERPL-SCNC: 104 MMOL/L (ref 98–107)
CHOLEST SERPL-MCNC: 177 MG/DL (ref 0–199)
CHOLESTEROL/HDL RATIO: 3.4
CO2 SERPL-SCNC: 27 MMOL/L (ref 20–31)
CREAT SERPL-MCNC: 0.7 MG/DL (ref 0.5–0.9)
EKG ATRIAL RATE: 77 BPM
EKG P AXIS: 65 DEGREES
EKG P-R INTERVAL: 136 MS
EKG Q-T INTERVAL: 346 MS
EKG QRS DURATION: 74 MS
EKG QTC CALCULATION (BAZETT): 391 MS
EKG R AXIS: 74 DEGREES
EKG T AXIS: 80 DEGREES
EKG VENTRICULAR RATE: 77 BPM
EOSINOPHIL # BLD: 0.05 K/UL (ref 0–0.4)
EOSINOPHILS RELATIVE PERCENT: 1 % (ref 0–5)
ERYTHROCYTE [DISTWIDTH] IN BLOOD BY AUTOMATED COUNT: 17.3 % (ref 12.1–15.2)
GFR, ESTIMATED: >90 ML/MIN/1.73M2
GLUCOSE SERPL-MCNC: 103 MG/DL (ref 70–99)
HCT VFR BLD AUTO: 26 % (ref 36–46)
HDLC SERPL-MCNC: 52 MG/DL
HGB BLD-MCNC: 8.3 G/DL (ref 12–16)
IMM GRANULOCYTES # BLD AUTO: 0.01 K/UL (ref 0–0.3)
IMM GRANULOCYTES NFR BLD: 0 % (ref 0–5)
LDLC SERPL CALC-MCNC: 103 MG/DL (ref 0–100)
LYMPHOCYTES NFR BLD: 0.89 K/UL (ref 1–4.8)
LYMPHOCYTES RELATIVE PERCENT: 25 % (ref 15–40)
MAGNESIUM SERPL-MCNC: 2.3 MG/DL (ref 1.6–2.6)
MCH RBC QN AUTO: 27.6 PG (ref 26–34)
MCHC RBC AUTO-ENTMCNC: 31.9 G/DL (ref 31–37)
MCV RBC AUTO: 86.4 FL (ref 80–100)
MONOCYTES NFR BLD: 0.48 K/UL (ref 0–1)
MONOCYTES NFR BLD: 14 % (ref 4–8)
NEUTROPHILS NFR BLD: 60 % (ref 47–75)
NEUTS SEG NFR BLD: 2.12 K/UL (ref 2.5–7)
PLATELET # BLD AUTO: 128 K/UL (ref 140–450)
PMV BLD AUTO: 8.1 FL (ref 6–12)
POTASSIUM SERPL-SCNC: 4.2 MMOL/L (ref 3.7–5.3)
PROT SERPL-MCNC: 6.9 G/DL (ref 6.4–8.3)
RBC # BLD AUTO: 3.01 M/UL (ref 4–5.2)
SODIUM SERPL-SCNC: 140 MMOL/L (ref 135–144)
TRIGL SERPL-MCNC: 108 MG/DL
TSH SERPL DL<=0.05 MIU/L-ACNC: 1.11 UIU/ML (ref 0.27–4.2)
VLDLC SERPL CALC-MCNC: 22 MG/DL (ref 1–30)
WBC OTHER # BLD: 3.6 K/UL (ref 3.5–11)

## 2025-08-26 PROCEDURE — 83735 ASSAY OF MAGNESIUM: CPT

## 2025-08-26 PROCEDURE — 80061 LIPID PANEL: CPT

## 2025-08-26 PROCEDURE — 80053 COMPREHEN METABOLIC PANEL: CPT

## 2025-08-26 PROCEDURE — 36415 COLL VENOUS BLD VENIPUNCTURE: CPT

## 2025-08-26 PROCEDURE — 1123F ACP DISCUSS/DSCN MKR DOCD: CPT | Performed by: NURSE PRACTITIONER

## 2025-08-26 PROCEDURE — 1160F RVW MEDS BY RX/DR IN RCRD: CPT | Performed by: NURSE PRACTITIONER

## 2025-08-26 PROCEDURE — 93005 ELECTROCARDIOGRAM TRACING: CPT

## 2025-08-26 PROCEDURE — 1159F MED LIST DOCD IN RCRD: CPT | Performed by: NURSE PRACTITIONER

## 2025-08-26 PROCEDURE — 93270 REMOTE 30 DAY ECG REV/REPORT: CPT

## 2025-08-26 PROCEDURE — 71046 X-RAY EXAM CHEST 2 VIEWS: CPT

## 2025-08-26 PROCEDURE — 84443 ASSAY THYROID STIM HORMONE: CPT

## 2025-08-26 PROCEDURE — 82306 VITAMIN D 25 HYDROXY: CPT

## 2025-08-26 PROCEDURE — 85025 COMPLETE CBC W/AUTO DIFF WBC: CPT

## 2025-08-26 PROCEDURE — 99214 OFFICE O/P EST MOD 30 MIN: CPT | Performed by: NURSE PRACTITIONER

## 2025-08-26 RX ORDER — ESCITALOPRAM OXALATE 10 MG/1
10 TABLET ORAL DAILY
COMMUNITY
Start: 2025-03-24 | End: 2026-02-14

## 2025-08-26 RX ORDER — TRIAMCINOLONE ACETONIDE 1 MG/G
1 CREAM TOPICAL 2 TIMES DAILY
COMMUNITY
Start: 2025-07-14

## 2025-08-26 RX ORDER — ACETAMINOPHEN 325 MG/1
650 TABLET ORAL EVERY 4 HOURS
COMMUNITY

## 2025-08-26 RX ORDER — PROCHLORPERAZINE MALEATE 10 MG
10 TABLET ORAL EVERY 8 HOURS PRN
COMMUNITY
Start: 2025-05-15

## 2025-08-26 RX ORDER — VALACYCLOVIR HYDROCHLORIDE 1 G/1
500 TABLET, FILM COATED ORAL PRN
COMMUNITY
Start: 2025-02-27

## 2025-08-26 RX ORDER — BUSPIRONE HYDROCHLORIDE 10 MG/1
10 TABLET ORAL 2 TIMES DAILY
COMMUNITY
Start: 2025-04-21